# Patient Record
Sex: FEMALE | Race: WHITE | NOT HISPANIC OR LATINO | Employment: OTHER | URBAN - METROPOLITAN AREA
[De-identification: names, ages, dates, MRNs, and addresses within clinical notes are randomized per-mention and may not be internally consistent; named-entity substitution may affect disease eponyms.]

---

## 2022-09-27 ENCOUNTER — OFFICE VISIT (OUTPATIENT)
Dept: GASTROENTEROLOGY | Facility: CLINIC | Age: 62
End: 2022-09-27
Payer: COMMERCIAL

## 2022-09-27 VITALS
WEIGHT: 146 LBS | SYSTOLIC BLOOD PRESSURE: 153 MMHG | HEART RATE: 96 BPM | BODY MASS INDEX: 24.32 KG/M2 | HEIGHT: 65 IN | DIASTOLIC BLOOD PRESSURE: 92 MMHG | OXYGEN SATURATION: 96 %

## 2022-09-27 DIAGNOSIS — R10.31 RIGHT LOWER QUADRANT PAIN: ICD-10-CM

## 2022-09-27 DIAGNOSIS — R10.32 LEFT LOWER QUADRANT PAIN: ICD-10-CM

## 2022-09-27 DIAGNOSIS — K58.1 IRRITABLE BOWEL SYNDROME WITH CONSTIPATION: Primary | ICD-10-CM

## 2022-09-27 DIAGNOSIS — Z79.02 LONG TERM (CURRENT) USE OF ANTITHROMBOTICS/ANTIPLATELETS: ICD-10-CM

## 2022-09-27 PROCEDURE — 3008F PR BODY MASS INDEX (BMI) DOCUMENTED: ICD-10-PCS | Mod: CPTII,S$GLB,, | Performed by: INTERNAL MEDICINE

## 2022-09-27 PROCEDURE — 1160F RVW MEDS BY RX/DR IN RCRD: CPT | Mod: CPTII,S$GLB,, | Performed by: INTERNAL MEDICINE

## 2022-09-27 PROCEDURE — 3080F DIAST BP >= 90 MM HG: CPT | Mod: CPTII,S$GLB,, | Performed by: INTERNAL MEDICINE

## 2022-09-27 PROCEDURE — 99204 OFFICE O/P NEW MOD 45 MIN: CPT | Mod: S$GLB,,, | Performed by: INTERNAL MEDICINE

## 2022-09-27 PROCEDURE — 1160F PR REVIEW ALL MEDS BY PRESCRIBER/CLIN PHARMACIST DOCUMENTED: ICD-10-PCS | Mod: CPTII,S$GLB,, | Performed by: INTERNAL MEDICINE

## 2022-09-27 PROCEDURE — 3077F SYST BP >= 140 MM HG: CPT | Mod: CPTII,S$GLB,, | Performed by: INTERNAL MEDICINE

## 2022-09-27 PROCEDURE — 1159F PR MEDICATION LIST DOCUMENTED IN MEDICAL RECORD: ICD-10-PCS | Mod: CPTII,S$GLB,, | Performed by: INTERNAL MEDICINE

## 2022-09-27 PROCEDURE — 99204 PR OFFICE/OUTPT VISIT, NEW, LEVL IV, 45-59 MIN: ICD-10-PCS | Mod: S$GLB,,, | Performed by: INTERNAL MEDICINE

## 2022-09-27 PROCEDURE — 3080F PR MOST RECENT DIASTOLIC BLOOD PRESSURE >= 90 MM HG: ICD-10-PCS | Mod: CPTII,S$GLB,, | Performed by: INTERNAL MEDICINE

## 2022-09-27 PROCEDURE — 3077F PR MOST RECENT SYSTOLIC BLOOD PRESSURE >= 140 MM HG: ICD-10-PCS | Mod: CPTII,S$GLB,, | Performed by: INTERNAL MEDICINE

## 2022-09-27 PROCEDURE — 3008F BODY MASS INDEX DOCD: CPT | Mod: CPTII,S$GLB,, | Performed by: INTERNAL MEDICINE

## 2022-09-27 PROCEDURE — 1159F MED LIST DOCD IN RCRD: CPT | Mod: CPTII,S$GLB,, | Performed by: INTERNAL MEDICINE

## 2022-09-27 RX ORDER — CARVEDILOL 6.25 MG/1
TABLET ORAL
COMMUNITY

## 2022-09-27 RX ORDER — CLOPIDOGREL BISULFATE 75 MG/1
TABLET ORAL
COMMUNITY
Start: 2022-09-08

## 2022-09-27 RX ORDER — SOD SULF/POT CHLORIDE/MAG SULF 1.479 G
12 TABLET ORAL DAILY
Qty: 24 TABLET | Refills: 0 | Status: SHIPPED | OUTPATIENT
Start: 2022-09-27

## 2022-09-27 RX ORDER — ZOLPIDEM TARTRATE 10 MG/1
TABLET ORAL
COMMUNITY
Start: 2022-09-19

## 2022-09-27 RX ORDER — KETOROLAC TROMETHAMINE 10 MG/1
TABLET, FILM COATED ORAL
COMMUNITY
Start: 2022-06-06

## 2022-09-27 RX ORDER — ASPIRIN 325 MG
TABLET ORAL
COMMUNITY

## 2022-09-27 RX ORDER — ROSUVASTATIN CALCIUM 10 MG/1
TABLET, COATED ORAL
COMMUNITY
Start: 2022-09-08

## 2022-09-27 RX ORDER — ESTRADIOL 1 MG/1
TABLET ORAL
COMMUNITY
Start: 2022-09-08

## 2022-09-27 RX ORDER — MELOXICAM 7.5 MG/1
TABLET ORAL
COMMUNITY
Start: 2022-08-24

## 2022-09-27 NOTE — PROGRESS NOTES
Clinic Note    Reason for visit:  The primary encounter diagnosis was Irritable bowel syndrome with constipation. Diagnoses of Left lower quadrant pain, Right lower quadrant pain, and Long term (current) use of antithrombotics/antiplatelets were also pertinent to this visit.    PCP: Olivier Almanzar       HPI:  This is a 61 y.o. female who is here to establish care. Patient reports h/o IBS-C. Has BM once every 2 weeks at baseline. She may take stool softener occasionally. She has tried Miralax in the past and states it did not work. She states a few months ago, she was having LLQ/RLQ abd pain that would come and go. Would last a whole day. Once she had a BM, the abdominal pain would resolve. She was given Linzess 72 to try and states may have BM by the next day, had hard stool then diarrhea. She tried Linzess 145 but states she had diarrhea. She also has nausea when constipation worse. Denies blood in stool. Patient states last colonoscopy about 5 years ago with Dr. Condon, can't recall if had polyps. No FH of colon cancer.      She has h/o brain aneurysms, last surgery in 2016. She also has coronary stent in 2011. On plavix. Had cerebral angiogram in 5/2022 and told everything looks well.    Cardiologist is Dr. Bryce Michael.     Review of Systems   Constitutional:  Negative for chills, diaphoresis, fatigue, fever and unexpected weight change.   HENT:  Negative for mouth sores, nosebleeds, postnasal drip, sore throat, trouble swallowing and voice change.    Eyes:  Negative for pain, discharge and eye dryness.   Respiratory:  Positive for shortness of breath. Negative for apnea, cough, choking, chest tightness and wheezing.    Cardiovascular:  Positive for chest pain. Negative for palpitations, leg swelling and claudication.   Gastrointestinal:  Positive for abdominal pain, constipation, diarrhea, nausea and vomiting. Negative for abdominal distention, anal bleeding, blood in stool, change in bowel habit, rectal  pain, reflux, fecal incontinence and change in bowel habit.   Genitourinary:  Positive for hematuria. Negative for bladder incontinence, difficulty urinating, dysuria, flank pain and frequency.   Musculoskeletal:  Positive for back pain. Negative for arthralgias, joint swelling and joint deformity.   Integumentary:  Negative for color change, rash and wound.   Allergic/Immunologic: Negative for environmental allergies and food allergies.   Neurological:  Positive for headaches and memory loss. Negative for seizures, facial asymmetry, speech difficulty and weakness.   Hematological:  Negative for adenopathy. Bruises/bleeds easily.   Psychiatric/Behavioral:  Positive for confusion and sleep disturbance. Negative for agitation, behavioral problems and hallucinations.       Past Medical History:   Diagnosis Date    BMI 24.0-24.9, adult     Brain aneurysm x3     CAD (coronary artery disease)     Essential (primary) hypertension     High cholesterol     Hormone deficiency     Hx of long term use of blood thinners     IBS (irritable bowel syndrome)     TIA (transient ischemic attack)     Trouble in sleeping      Past Surgical History:   Procedure Laterality Date    CAROTID ENDARTERECTOMY      2008    CEREBRAL EMBOLIZATION      2009 due to bleed, 2010 preventive    CHOLECYSTECTOMY      COLONOSCOPY      CORONARY STENT PLACEMENT  2011    CRANIOTOMY      for aneurysm #3 stent 2016 (not amenable to coiling), open craniotomy    HYSTERECTOMY      NECK SURGERY       Family History   Problem Relation Age of Onset    Kidney disease Mother     Kidney disease Father     Diabetes Father      Social History     Tobacco Use    Smoking status: Former     Packs/day: 0.50     Types: Cigarettes    Smokeless tobacco: Never   Substance Use Topics    Alcohol use: Never    Drug use: Never     Review of patient's allergies indicates:   Allergen Reactions    Codeine Swelling    Morphine (pf) Itching    Penicillins Hives        Medication List with  "Changes/Refills   New Medications    SOD SULF-POT CHLORIDE-MAG SULF (SUTAB) 1.479-0.188- 0.225 GRAM TABLET    Take 12 tablets by mouth once daily. Take according to package instructions with indicated amount of water. No breakfast day before test. May substitute with Suprep, Clenpiq, Plenvu, Moviprep or GoLytely based on Rx plan and patient preference.   Current Medications    ASPIRIN 325 MG TABLET        CARVEDILOL (COREG) 6.25 MG TABLET        CLOPIDOGREL (PLAVIX) 75 MG TABLET        ESTRADIOL (ESTRACE) 1 MG TABLET        KETOROLAC (TORADOL) 10 MG TABLET        MELOXICAM (MOBIC) 7.5 MG TABLET        ROSUVASTATIN (CRESTOR) 10 MG TABLET        ZOLPIDEM (AMBIEN) 10 MG TAB       Changed and/or Refilled Medications    Modified Medication Previous Medication    LINACLOTIDE (LINZESS) 72 MCG CAP CAPSULE linaCLOtide (LINZESS) 72 mcg Cap capsule       Take 1 capsule (72 mcg total) by mouth before breakfast.    Take 72 mcg by mouth before breakfast.         Vital Signs:  BP (!) 153/92 (BP Location: Right arm, Patient Position: Sitting, BP Method: Medium (Automatic))   Pulse 96   Ht 5' 5" (1.651 m)   Wt 66.2 kg (146 lb)   SpO2 96%   BMI 24.30 kg/m²         Physical Exam  Vitals reviewed.   Constitutional:       General: She is awake. She is not in acute distress.     Appearance: Normal appearance. She is well-developed. She is not ill-appearing, toxic-appearing or diaphoretic.   HENT:      Head: Normocephalic and atraumatic.      Nose: Nose normal.      Mouth/Throat:      Mouth: Mucous membranes are moist.      Pharynx: Oropharynx is clear. No oropharyngeal exudate or posterior oropharyngeal erythema.   Eyes:      General: Lids are normal. Gaze aligned appropriately. No scleral icterus.        Right eye: No discharge.         Left eye: No discharge.      Extraocular Movements: Extraocular movements intact.      Conjunctiva/sclera: Conjunctivae normal.   Neck:      Trachea: Trachea normal.   Cardiovascular:      Rate and " Rhythm: Normal rate and regular rhythm.      Pulses:           Radial pulses are 2+ on the right side and 2+ on the left side.   Pulmonary:      Effort: Pulmonary effort is normal. No respiratory distress.      Breath sounds: Normal breath sounds. No stridor. No wheezing or rhonchi.   Chest:      Chest wall: No tenderness.   Abdominal:      General: Bowel sounds are normal. There is no distension.      Palpations: Abdomen is soft. There is no fluid wave, hepatomegaly or mass.      Tenderness: There is no abdominal tenderness. There is no guarding or rebound.   Musculoskeletal:         General: No tenderness or deformity.      Cervical back: Full passive range of motion without pain and neck supple. No tenderness.      Right lower leg: No edema.      Left lower leg: No edema.   Lymphadenopathy:      Cervical: No cervical adenopathy.   Skin:     General: Skin is warm and dry.      Capillary Refill: Capillary refill takes less than 2 seconds.      Coloration: Skin is not cyanotic, jaundiced or pale.      Findings: No rash.   Neurological:      General: No focal deficit present.      Mental Status: She is alert and oriented to person, place, and time.      Cranial Nerves: No facial asymmetry.      Motor: No tremor.   Psychiatric:         Attention and Perception: Attention normal.         Mood and Affect: Mood and affect normal.         Speech: Speech normal.         Behavior: Behavior normal. Behavior is cooperative.          All of the data above and below has been reviewed by myself and any further interpretations will be reflected in the assessment and plan.   The data includes review of external notes, and independent interpretation of lab results, procedures, x-rays, and imaging reports.      Assessment:  Irritable bowel syndrome with constipation  -     linaCLOtide (LINZESS) 72 mcg Cap capsule; Take 1 capsule (72 mcg total) by mouth before breakfast.  Dispense: 90 capsule; Refill: 3  -     Ambulatory Referral to  External Surgery    Left lower quadrant pain  -     Ambulatory Referral to External Surgery    Right lower quadrant pain  -     Ambulatory Referral to External Surgery    Long term (current) use of antithrombotics/antiplatelets    Other orders  -     sod sulf-pot chloride-mag sulf (SUTAB) 1.479-0.188- 0.225 gram tablet; Take 12 tablets by mouth once daily. Take according to package instructions with indicated amount of water. No breakfast day before test. May substitute with Suprep, Clenpiq, Plenvu, Moviprep or GoLytely based on Rx plan and patient preference.  Dispense: 24 tablet; Refill: 0    IBS-C: tried and failed Miralax. Linzess 145 gave diarrhea, Linzess 72 with some help. Will continue Linzess 72 daily.   Lower abd pain likely related to constipation.   Told by Chantale that should take Plavix longterm.  Send clearance letters to Dr. Bryce Michael and Dr. Velasco (neuroradiologist) [facility] at telephone number [phone]. Confirm we get release in 2 weeks.      Recommendations:  Schedule colonoscopy. Plan to hold Plavix for 5 days before procedure.   Begin taking Linzess 72 mcg daily for constipation. May take 2 pills if needed. We will get clearance from Bryce Michael and [facility] doctor.    Risks, benefits, and alternatives of medical management, any associated procedures, and/or treatment discussed with the patient. Patient given opportunity to ask questions and voices understanding. Patient has elected to proceed with the recommended care modalities as discussed.    Follow up in about 6 months (around 3/27/2023).    Order summary:  Orders Placed This Encounter   Procedures    Ambulatory Referral to External Surgery          Instructed patient to notify my office if they have not been contacted within two weeks after any procedures, submitting any samples (biopsies, blood, stool, urine, etc.) or after any imaging (X-ray, CT, MRI, etc.).     Siobhan Moon MD    This document may have been  created using a voice recognition transcribing system. Incorrect words or phrases may have been missed during proofreading. Please interpret accordingly or contact me for clarification.

## 2022-09-27 NOTE — LETTER
September 27, 2022        Olivier Almanzar MD  [facility]  [place] [address]             [facility] [facility]  [address] [address]  Phone: [phone]  Fax: [phone]   Patient: Haleigh Land   MR Number: [id number]   YOB: 1960   Date of Visit: 9/27/2022       Dear Dr. Almanzar:    Thank you for referring Haleigh Land to me for evaluation. Attached you will find relevant portions of my assessment and plan of care.    If you have questions, please do not hesitate to call me. I look forward to following Haleigh Land along with you.    Sincerely,      Siobhan Moon MD            CC  No Recipients    Enclosure

## 2022-09-27 NOTE — LETTER
September 27, 2022        Harsh Michael MD  [facility] [address]  [place] [address]             [facility] [facility]  [address] [address]  Phone: [phone]  Fax: [phone]   Patient: Haleigh Land   MR Number: [id number]   YOB: 1960   Date of Visit: 9/27/2022       Dear Dr. Michael:    Thank you for referring Haleigh Land to me for evaluation. Attached you will find relevant portions of my assessment and plan of care.    If you have questions, please do not hesitate to call me. I look forward to following Haleigh Land along with you.    Sincerely,      Siobhan Moon MD            CC  No Recipients    Enclosure

## 2022-09-27 NOTE — PATIENT INSTRUCTIONS
Schedule colonoscopy. Plan to hold Plavix for 5 days before procedure.   Begin taking Linzess 72 mcg daily for constipation. May take 2 pills if needed. We will get clearance from Bryce Michael and [place] doctor.    Please notify my office if you have not been contacted within two weeks after any procedures, submitting any samples (biopsies, blood, stool, urine, etc.) or after any imaging (X-ray, CT, MRI, etc.).

## 2022-12-07 ENCOUNTER — TELEPHONE (OUTPATIENT)
Dept: GASTROENTEROLOGY | Facility: CLINIC | Age: 62
End: 2022-12-07

## 2022-12-07 ENCOUNTER — OUTSIDE PLACE OF SERVICE (OUTPATIENT)
Dept: GASTROENTEROLOGY | Facility: CLINIC | Age: 62
End: 2022-12-07

## 2022-12-07 LAB — CRC RECOMMENDATION EXT: NORMAL

## 2022-12-07 PROCEDURE — 45378 DIAGNOSTIC COLONOSCOPY: CPT | Mod: 52,,, | Performed by: INTERNAL MEDICINE

## 2022-12-07 PROCEDURE — 45378 PR COLONOSCOPY,DIAGNOSTIC: ICD-10-PCS | Mod: 52,,, | Performed by: INTERNAL MEDICINE

## 2022-12-07 NOTE — TELEPHONE ENCOUNTER
The patient describes having a CT scan that found [place] [facility] [place] [facility].  She will come by tomorrow or later to sign a release form if you can have it ready for her to sign at the .  I need all of her records from this year.  I will need to determine if we need to order a CT enterography, colon transit study, or barium enema.  NBP

## 2023-01-03 ENCOUNTER — TELEPHONE (OUTPATIENT)
Dept: GASTROENTEROLOGY | Facility: CLINIC | Age: 63
End: 2023-01-03

## 2023-01-03 DIAGNOSIS — R93.3 ABNORMAL FINDING ON GI TRACT IMAGING: ICD-10-CM

## 2023-01-03 DIAGNOSIS — R10.32 LEFT LOWER QUADRANT PAIN: Primary | ICD-10-CM

## 2023-01-03 DIAGNOSIS — R10.31 RIGHT LOWER QUADRANT PAIN: ICD-10-CM

## 2023-01-03 NOTE — TELEPHONE ENCOUNTER
1/22/2022 ER visit: CT CAP w/o: bilateral infiltrates of lungs, left kidney infl/dil, CMP/CBC/UA wnl, UDS amp+.  12/7/2022 colonoscopy incomplete to HF (gastroscope used to traverse sigmoid colon.    Notify patient that I reviewed her records from 1/2022 [facility] facility.  The CT there commented on lung infiltrates thought to be pneumonia and recommend repeat chest CT. Did she ever get that completed? The CT of the abd was without contrast. I rec and CT enterography to better evaluate the GI tract. Send records to her PHCP and she should follow up with him regarding the lung findings.  NBP

## 2023-01-03 NOTE — TELEPHONE ENCOUNTER
LVM with information to pt.  CT order faxed to central scheduling.  Records from [facility] faxed to PCP.

## 2023-01-12 NOTE — TELEPHONE ENCOUNTER
I was contacted by the nurse navigator at [facility] January 4, 2023.  The patient was admitted to [facility] and was found to have a mass/obstruction.  She was seeing Dr. Morton at that hospital.  The nurse is having a CT scan and the history and physical sent to me for review.  The patient is pending an ERCP (may have meant EUS) tomorrow to biopsy a pancreatic mass.  Mallory Daniels RN, will also send the operative and pathology reports once completed.    May cancel my CT order for now.  YANA

## 2023-01-18 NOTE — TELEPHONE ENCOUNTER
Per nurse navigator, ERCP biopsy was positive for cancer. CT report reviewed in media. BOP mass 1.6cm.  NBP

## 2023-01-30 ENCOUNTER — DOCUMENTATION ONLY (OUTPATIENT)
Dept: GASTROENTEROLOGY | Facility: CLINIC | Age: 63
End: 2023-01-30
Payer: COMMERCIAL